# Patient Record
Sex: FEMALE | Race: WHITE | Employment: OTHER | URBAN - METROPOLITAN AREA
[De-identification: names, ages, dates, MRNs, and addresses within clinical notes are randomized per-mention and may not be internally consistent; named-entity substitution may affect disease eponyms.]

---

## 2020-02-05 ENCOUNTER — OFFICE VISIT (OUTPATIENT)
Dept: FAMILY MEDICINE | Facility: CLINIC | Age: 75
End: 2020-02-05
Payer: COMMERCIAL

## 2020-02-05 VITALS
DIASTOLIC BLOOD PRESSURE: 73 MMHG | TEMPERATURE: 97.6 F | OXYGEN SATURATION: 98 % | WEIGHT: 226.13 LBS | HEART RATE: 94 BPM | HEIGHT: 62 IN | BODY MASS INDEX: 41.61 KG/M2 | SYSTOLIC BLOOD PRESSURE: 119 MMHG

## 2020-02-05 DIAGNOSIS — I10 HYPERTENSION GOAL BP (BLOOD PRESSURE) < 140/90: ICD-10-CM

## 2020-02-05 DIAGNOSIS — J31.0 RHINITIS, UNSPECIFIED TYPE: Primary | ICD-10-CM

## 2020-02-05 DIAGNOSIS — J01.90 ACUTE SINUSITIS, RECURRENCE NOT SPECIFIED, UNSPECIFIED LOCATION: ICD-10-CM

## 2020-02-05 PROCEDURE — 99203 OFFICE O/P NEW LOW 30 MIN: CPT | Performed by: FAMILY MEDICINE

## 2020-02-05 RX ORDER — LISINOPRIL AND HYDROCHLOROTHIAZIDE 12.5; 2 MG/1; MG/1
TABLET ORAL
COMMUNITY
Start: 2019-10-16

## 2020-02-05 RX ORDER — PRAMIPEXOLE DIHYDROCHLORIDE 0.12 MG/1
0.12 TABLET ORAL AT BEDTIME
COMMUNITY
Start: 2019-11-19

## 2020-02-05 RX ORDER — TRAZODONE HYDROCHLORIDE 150 MG/1
150 TABLET ORAL AT BEDTIME
COMMUNITY
Start: 2019-12-10

## 2020-02-05 RX ORDER — FLUTICASONE PROPIONATE 50 MCG
2 SPRAY, SUSPENSION (ML) NASAL DAILY
Qty: 16 G | Refills: 5 | Status: SHIPPED | OUTPATIENT
Start: 2020-02-05

## 2020-02-05 RX ORDER — AMOXICILLIN 500 MG/1
500 CAPSULE ORAL 3 TIMES DAILY
Qty: 30 CAPSULE | Refills: 0 | Status: SHIPPED | OUTPATIENT
Start: 2020-02-05

## 2020-02-05 RX ORDER — FUROSEMIDE 20 MG
20 TABLET ORAL DAILY
COMMUNITY
Start: 2020-01-21

## 2020-02-05 SDOH — HEALTH STABILITY: MENTAL HEALTH: HOW OFTEN DO YOU HAVE A DRINK CONTAINING ALCOHOL?: NEVER

## 2020-02-05 ASSESSMENT — MIFFLIN-ST. JEOR: SCORE: 1473.95

## 2020-02-05 NOTE — PROGRESS NOTES
"Subjective     Eboni Cruz is a 74 year old female who presents to clinic today for the following health issues:    HPI   Hearing loss in right ear        sounds  Like  Ear  Going  Through right  Ear    A  Little  Ringing    No  Pain    No fever/ chills    No change in cough    Lots of phlegm all the time     Draining down  Back    Occasionally coughs up some    Has rls           Reviewed and updated as needed this visit by Provider         Review of Systems   ROS COMP: see  Above    History of deviated septum, had surgery in  Past for  That     Hard to breathe through nose in am       Objective    /73 (BP Location: Left arm, Patient Position: Chair, Cuff Size: Adult Large)   Pulse 94   Temp 97.6  F (36.4  C) (Oral)   Ht 1.575 m (5' 2\")   Wt 102.6 kg (226 lb 2 oz)   SpO2 98%   Breastfeeding No   BMI 41.36 kg/m    Body mass index is 41.36 kg/m .  Physical Exam  Constitutional:       Appearance: She is well-developed.   HENT:      Head: Normocephalic and atraumatic.      Right Ear: Tympanic membrane, ear canal and external ear normal.      Left Ear: Tympanic membrane, ear canal and external ear normal.      Mouth/Throat:      Mouth: Mucous membranes are moist.      Pharynx: Oropharynx is clear.   Eyes:      Conjunctiva/sclera: Conjunctivae normal.   Neck:      Musculoskeletal: Neck supple.      Vascular: No carotid bruit.   Cardiovascular:      Rate and Rhythm: Normal rate and regular rhythm.      Heart sounds: Normal heart sounds.   Pulmonary:      Effort: Pulmonary effort is normal. No respiratory distress.      Breath sounds: Normal breath sounds.   Musculoskeletal:      Right lower leg: Edema present.      Left lower leg: Edema present.   Neurological:      Mental Status: She is alert and oriented to person, place, and time.      mild  Pretibial edema; some chronic appearing discoloration anterior lower legs    Not  Tender sinus/  submandib  Area    Throat okay    Nasal mucosa swollen/red " especially right side with some  Greenish  Discharge right side           Diagnostic Test Results:  Labs reviewed in Epic           ASSESSMENT / PLAN:  (J31.0) Rhinitis, unspecified type  (primary encounter diagnosis)  Comment: no obvious ear infection, no wax.  Plan: fluticasone (FLONASE) 50 MCG/ACT nasal spray,         OTOLARYNGOLOGY REFERRAL        Patient will stop the old nasal spray she is using and start fluticasone, and also try occasional sudafed ( warned of side effects ).       (J01.90) Acute sinusitis, recurrence not specified, unspecified location  Comment: if not better with fluticasone and sudafed then fill prescription for antibiotic  Plan: amoxicillin (AMOXIL) 500 MG capsule,         OTOLARYNGOLOGY REFERRAL            If not better after  Antibiotics, then see ENT     Did referral     (I10) Hypertension goal BP (blood pressure) < 140/90  Comment:   At goal   Plan: no change       Be seen promptly if symptoms acutely worsen          I reviewed the patient's medications, allergies, medical history, family history, and social history.    Ted Pisano MD

## 2020-02-05 NOTE — PATIENT INSTRUCTIONS
Start the fluticasone nasal spray 2 sprays each  Nostril daily    Use occasional sudafed ( pseudoephedrine ) as needed for eustacian tube dysfunction     If not better with above, then fill  Prescription for antibiotic    If you take antibiotic and not  Better then see ENT doctor    Be seen promptly if symptoms acutely worsen

## 2020-02-13 PROBLEM — I10 HYPERTENSION GOAL BP (BLOOD PRESSURE) < 140/90: Status: ACTIVE | Noted: 2020-02-13

## 2020-03-05 ENCOUNTER — OFFICE VISIT (OUTPATIENT)
Dept: FAMILY MEDICINE | Facility: CLINIC | Age: 75
End: 2020-03-05
Payer: COMMERCIAL

## 2020-03-05 VITALS
BODY MASS INDEX: 41.22 KG/M2 | WEIGHT: 224 LBS | OXYGEN SATURATION: 95 % | RESPIRATION RATE: 16 BRPM | SYSTOLIC BLOOD PRESSURE: 134 MMHG | TEMPERATURE: 97.8 F | HEART RATE: 115 BPM | HEIGHT: 62 IN | DIASTOLIC BLOOD PRESSURE: 76 MMHG

## 2020-03-05 DIAGNOSIS — J44.1 COPD EXACERBATION (H): Primary | ICD-10-CM

## 2020-03-05 PROCEDURE — 99214 OFFICE O/P EST MOD 30 MIN: CPT | Performed by: NURSE PRACTITIONER

## 2020-03-05 RX ORDER — LEVOFLOXACIN 500 MG/1
500 TABLET, FILM COATED ORAL DAILY
Qty: 5 TABLET | Refills: 0 | Status: SHIPPED | OUTPATIENT
Start: 2020-03-05 | End: 2020-03-10

## 2020-03-05 ASSESSMENT — MIFFLIN-ST. JEOR: SCORE: 1464.31

## 2020-03-05 NOTE — PROGRESS NOTES
"Subjective     Eboni Cruz is a 74 year old female who presents to clinic today for the following health issues:    HPI     RESPIRATORY SYMPTOMS      Duration: for weeks, was seen at [facility] for same thing and nothing is helping     Description  nasal congestion, cough, wheezing, headache, fatigue/malaise and hoarse voice    Severity: severe    Accompanying signs and symptoms: SOB    History (predisposing factors):  exposure to smoke    Precipitating or alleviating factors: None    Therapies tried and outcome:  PredniSONE and doxycycline 3 days    Xray was negative     History reviewed. No pertinent past medical history.  Current Outpatient Medications   Medication     amoxicillin (AMOXIL) 500 MG capsule     fluticasone (FLONASE) 50 MCG/ACT nasal spray     furosemide (LASIX) 20 MG tablet     levofloxacin (LEVAQUIN) 500 MG tablet     lisinopril-hydrochlorothiazide (PRINZIDE/ZESTORETIC) 20-12.5 MG tablet     pramipexole (MIRAPEX) 0.125 MG tablet     traZODone (DESYREL) 150 MG tablet     No current facility-administered medications for this visit.         Allergies   Allergen Reactions     Nabumetone Hives       Review of Systems   ROS COMP: Constitutional, HEENT, cardiovascular, pulmonary, GI, , musculoskeletal, neuro, skin, endocrine and psych systems are negative, except as otherwise noted.      Objective    /76   Pulse 115   Temp 97.8  F (36.6  C) (Oral)   Resp 16   Ht 1.575 m (5' 2\")   Wt 101.6 kg (224 lb)   SpO2 95%   BMI 40.97 kg/m    Physical Exam   GENERAL: alert and no distress  EYES: Eyes grossly normal to inspection, PERRL and conjunctivae and sclerae normal  HENT: ear canals and TM's normal, nose and mouth without ulcers or lesions  NECK: no adenopathy, no asymmetry, masses, or scars and thyroid normal to palpation  RESP: expiratory wheezes throughout  CV: regular rate and rhythm, normal S1 S2, no S3 or S4, no murmur, click or rub, no peripheral edema and peripheral pulses strong  SKIN: " no suspicious lesions or rashes  NEURO: Normal strength and tone, mentation intact and speech normal  PSYCH: mentation appears normal, affect normal/bright          Assessment & Plan     1. COPD exacerbation (H)  Discontinue doxycyline. Has been on 3 days and no improvement, slight worsening.   - levofloxacin (LEVAQUIN) 500 MG tablet; Take 1 tablet (500 mg) by mouth daily for 5 days  Dispense: 5 tablet; Refill: 0  Continue prednisone, nebs inhalers  If emergent symptoms as reviewed to ER   Not improving see pcp Monday      TOBIN Barber CNP  [facility]